# Patient Record
Sex: FEMALE | URBAN - METROPOLITAN AREA
[De-identification: names, ages, dates, MRNs, and addresses within clinical notes are randomized per-mention and may not be internally consistent; named-entity substitution may affect disease eponyms.]

---

## 2021-10-25 ENCOUNTER — OUTPATIENT (OUTPATIENT)
Dept: OUTPATIENT SERVICES | Facility: HOSPITAL | Age: 51
LOS: 1 days | End: 2021-10-25

## 2023-03-15 PROBLEM — Z00.00 ENCOUNTER FOR PREVENTIVE HEALTH EXAMINATION: Status: ACTIVE | Noted: 2023-03-15

## 2024-06-24 ENCOUNTER — APPOINTMENT (OUTPATIENT)
Dept: ORTHOPEDIC SURGERY | Facility: CLINIC | Age: 54
End: 2024-06-24

## 2024-09-09 ENCOUNTER — APPOINTMENT (OUTPATIENT)
Dept: ORTHOPEDIC SURGERY | Facility: CLINIC | Age: 54
End: 2024-09-09
Payer: COMMERCIAL

## 2024-09-09 VITALS — WEIGHT: 125 LBS | BODY MASS INDEX: 23 KG/M2 | HEIGHT: 62 IN

## 2024-09-09 DIAGNOSIS — R22.31 LOCALIZED SWELLING, MASS AND LUMP, RIGHT UPPER LIMB: ICD-10-CM

## 2024-09-09 DIAGNOSIS — Z78.9 OTHER SPECIFIED HEALTH STATUS: ICD-10-CM

## 2024-09-09 PROCEDURE — 73140 X-RAY EXAM OF FINGER(S): CPT | Mod: RT

## 2024-09-09 PROCEDURE — 99204 OFFICE O/P NEW MOD 45 MIN: CPT

## 2024-09-09 NOTE — HISTORY OF PRESENT ILLNESS
[de-identified] : Age: 54 year F PMHx: none Hand Dominance: RHD Chief Complaint: Right middle finger mass onset approx. 2021 s/p trauma. Patient reports that she banged her right middle finger and states that the mass formed soon afterwards. Patient reports that she was initially ignoring the mass until it had grown in size, prompting her to get evaluated by [facility] on 05/04/23. Patient had radiographs performed that showed the size of the mass. Patient states that she noticed her bump was getting bigger and possibly splitting into 2 bumps, prompting her to get evaluated by [facility]. Denies pain. Denies numbness/tingling.  Trauma: NKI Outside Imaging/Treatment: 05/04/23 from [facility] OTC Medications: none OT/PT: none Bracing: none Pain worse with: N/a Pain better with:  N/A

## 2024-09-09 NOTE — ASSESSMENT
[FreeTextEntry1] : Right middle finger mass - reviewed pathoanatomy with patient. Discussed that operative excision would be the definitive way to identify the pathology of the mass. Patient voiced interest in doing so. We will obtain right middle finger U/S to further detail characteristics of mass and follow-up thereafter to discuss results and develop plan of care.  F/u after U/S (discussed local only)

## 2024-09-09 NOTE — IMAGING
[de-identified] : Right middle finger with 1.0 cm wide mass at dorsal DIPj. Firm, nontender. Able to fully flex and extend at MCP, PIP and DIP. Sensation intact at radial and ulnar aspects of pulp. <2sec cap refill.   Right middle finger radiographs with no fracture nor disclaotion. Mass shadow at DIPj. (3-view) - as viewed from an outside facility